# Patient Record
Sex: FEMALE | Employment: OTHER | URBAN - METROPOLITAN AREA
[De-identification: names, ages, dates, MRNs, and addresses within clinical notes are randomized per-mention and may not be internally consistent; named-entity substitution may affect disease eponyms.]

---

## 2020-12-04 ENCOUNTER — CONSULTATION (OUTPATIENT)
Dept: URBAN - METROPOLITAN AREA CLINIC 11 | Facility: CLINIC | Age: 55
End: 2020-12-04

## 2020-12-04 ASSESSMENT — TONOMETRY
OD_IOP_MMHG: 15
OS_IOP_MMHG: 13

## 2020-12-04 ASSESSMENT — VISUAL ACUITY
OS_SC: 20/25
OD_SC: 20/200

## 2020-12-04 NOTE — PATIENT DISCUSSION
Greater than 50% of exam spent in face to face dialogue with Mrs. Colt Shaikh and her . I have explained that she has a macular hole in her right retina. I have recommended that she have surgery to repair the hole. All questions and concerns addressed at length and she would like to proceed with scheduling.

## 2020-12-04 NOTE — PATIENT DISCUSSION
Explained that [address] has A HIGH SUCCESS RATE and the vision is likely to improve, but vision may be limited post-operatively by residual edema or anatomic irregularity.

## 2021-04-12 ENCOUNTER — FOLLOW UP (OUTPATIENT)
Dept: URBAN - METROPOLITAN AREA CLINIC 18 | Facility: CLINIC | Age: 56
End: 2021-04-12

## 2021-04-12 NOTE — PATIENT DISCUSSION
Patient understands condition, prognosis and need for follow up care. Over 40 minutes was spent in conversation with Mrs. Tin Oneal today. She is happy with her visual result and understands her post surgical outcome. She is ready for cataract evaluation and possible surgery. I would like her to see Dr. Bronson Boswell for an appointment. Please contact her at [phone] to schedule an appointment.

## 2021-04-13 ASSESSMENT — TONOMETRY
OS_IOP_MMHG: 15
OD_IOP_MMHG: 19

## 2021-04-13 ASSESSMENT — VISUAL ACUITY
OD_PH: 20/70
OD_SC: 20/200 BLURRY
OS_SC: 20/25